# Patient Record
Sex: MALE | URBAN - METROPOLITAN AREA
[De-identification: names, ages, dates, MRNs, and addresses within clinical notes are randomized per-mention and may not be internally consistent; named-entity substitution may affect disease eponyms.]

---

## 2017-10-03 ENCOUNTER — IMPORTED ENCOUNTER (OUTPATIENT)
Dept: URBAN - METROPOLITAN AREA CLINIC 1 | Facility: CLINIC | Age: 62
End: 2017-10-03

## 2017-10-03 PROBLEM — H25.813: Noted: 2017-10-03

## 2017-10-03 PROBLEM — H40.023: Noted: 2017-10-03

## 2017-10-03 PROBLEM — H25.043: Noted: 2017-10-03

## 2017-10-03 PROBLEM — H43.811: Noted: 2017-10-03

## 2017-10-03 PROBLEM — Z79.4: Noted: 2017-10-03

## 2017-10-03 PROBLEM — E11.9: Noted: 2017-10-03

## 2017-10-03 PROCEDURE — 92004 COMPRE OPH EXAM NEW PT 1/>: CPT

## 2017-10-03 PROCEDURE — 92015 DETERMINE REFRACTIVE STATE: CPT

## 2017-10-03 NOTE — PATIENT DISCUSSION
1.  DM Type II without sign of diabetic retinopathy and no blot heme on dilated retinal examination today OU No Macular Edema. Discussed the pathophysiology of diabetes and its effect on the eye and risk of blindness. Stressed the importance of strong glucose control. Advised of importance of at least yearly dilated examinations but to contact us immediately for any problems or concerns. 2. Type II Insulin dependent diabetes mellitus3. Cataract OU: Visually Significant secondary to glare discussed the risks benefits alternatives and limitations of cataract surgery. The patient stated a full understanding and a desire to proceed with the procedure. The patient will need to return for preop appointment with cataract measurements and to have any additional questions answered and start pre-operative eye drops as directed. Schedule phaco PCL OS then ODOtherwise follow-up within 3 months for a 10/OCT/HVF4. Glaucoma Suspect OU (0.7 OU): Normal IOP OU. Suspicious C/D OU. Baseline w/u after phaco. Patient is considered high risk. Condition was discussed with patient and patient understands. Will continue to monitor patient for any progression in condition. Patient was advised to call us with any problems questions or concerns. 5.  PVD w/o Tear OD- RD precautions. 6.  Return for an appointment for ascan H&P with Dr. Clara Anaya.

## 2022-04-02 ASSESSMENT — VISUAL ACUITY
OS_GLARE: 20/100
OD_CC: 20/80-1
OD_GLARE: 20/100
OS_CC: 20/50

## 2022-04-02 ASSESSMENT — TONOMETRY
OS_IOP_MMHG: 14
OD_IOP_MMHG: 14